# Patient Record
Sex: MALE | Race: WHITE
[De-identification: names, ages, dates, MRNs, and addresses within clinical notes are randomized per-mention and may not be internally consistent; named-entity substitution may affect disease eponyms.]

---

## 2017-08-01 ENCOUNTER — HOSPITAL ENCOUNTER (OUTPATIENT)
Dept: HOSPITAL 63 - DXRADRC | Age: 67
Discharge: HOME | End: 2017-08-01
Attending: PHYSICIAN ASSISTANT
Payer: MEDICARE

## 2017-08-01 DIAGNOSIS — M25.521: Primary | ICD-10-CM

## 2017-08-01 PROCEDURE — 73080 X-RAY EXAM OF ELBOW: CPT

## 2017-08-01 NOTE — RAD
Right elbow, 3 views, 8/1/2017:



History: Elbow pain



No fracture or dislocation is identified. There is mild spurring arising from

the coronoid process of the proximal ulna. No joint effusion is evident.



IMPRESSION: No acute bony abnormality is detected.

## 2017-10-24 ENCOUNTER — HOSPITAL ENCOUNTER (OUTPATIENT)
Dept: HOSPITAL 63 - DXRADRC | Age: 67
Discharge: HOME | End: 2017-10-24
Attending: NURSE PRACTITIONER
Payer: MEDICARE

## 2017-10-24 DIAGNOSIS — R05: ICD-10-CM

## 2017-10-24 DIAGNOSIS — R09.89: Primary | ICD-10-CM

## 2017-10-24 DIAGNOSIS — M53.80: ICD-10-CM

## 2017-10-24 PROCEDURE — 71020: CPT

## 2017-10-24 NOTE — RAD
Chest, 2 views, 10/24/2017:



History: Nonproductive cough and sinus congestion



Comparison is made to a study from 3/1/2017. The heart size and pulmonary

vascularity are normal. There are calcified nodes at the right hilum. No acute

infiltrate is seen. There is no evidence of pleural fluid. Moderate scattered

spurs are present in the spine.



IMPRESSION: No acute cardiopulmonary abnormality is detected.

## 2018-01-19 ENCOUNTER — HOSPITAL ENCOUNTER (EMERGENCY)
Dept: HOSPITAL 61 - ER | Age: 68
Discharge: HOME | End: 2018-01-19
Payer: MEDICARE

## 2018-01-19 VITALS — DIASTOLIC BLOOD PRESSURE: 66 MMHG | SYSTOLIC BLOOD PRESSURE: 138 MMHG

## 2018-01-19 DIAGNOSIS — R09.81: ICD-10-CM

## 2018-01-19 DIAGNOSIS — Z88.0: ICD-10-CM

## 2018-01-19 DIAGNOSIS — I10: ICD-10-CM

## 2018-01-19 DIAGNOSIS — R42: Primary | ICD-10-CM

## 2018-01-19 DIAGNOSIS — I48.91: ICD-10-CM

## 2018-01-19 LAB
ADD MAN DIFF?: NO
ALBUMIN SERPL-MCNC: 3.5 G/DL (ref 3.4–5)
ALBUMIN/GLOB SERPL: 0.9 {RATIO} (ref 1–1.7)
ALP SERPL-CCNC: 77 U/L (ref 46–116)
ALT (SGPT): 25 U/L (ref 16–63)
ANION GAP SERPL CALC-SCNC: 11 MMOL/L (ref 6–14)
AST SERPL-CCNC: 20 U/L (ref 15–37)
BASO #: 0.1 X10^3/UL (ref 0–0.2)
BASO %: 1 % (ref 0–3)
BLOOD UREA NITROGEN: 19 MG/DL (ref 8–26)
BUN/CREAT SERPL: 21 (ref 6–20)
CALCIUM: 8.3 MG/DL (ref 8.5–10.1)
CHLORIDE: 104 MMOL/L (ref 98–107)
CO2 SERPL-SCNC: 27 MMOL/L (ref 21–32)
CREAT SERPL-MCNC: 0.9 MG/DL (ref 0.7–1.3)
EOS #: 0.3 X10^3/UL (ref 0–0.7)
EOS %: 5 % (ref 0–3)
GFR SERPLBLD BASED ON 1.73 SQ M-ARVRAT: 84.4 ML/MIN
GLOBULIN SER-MCNC: 3.9 G/DL (ref 2.2–3.8)
GLUCOSE SERPL-MCNC: 103 MG/DL (ref 70–99)
HCG SERPL-ACNC: 6.2 X10^3/UL (ref 4–11)
HEMATOCRIT: 45.8 % (ref 39–53)
HEMOGLOBIN: 15.3 G/DL (ref 13–17.5)
LYMPH #: 2 X10^3/UL (ref 1–4.8)
LYMPH %: 33 % (ref 24–48)
MAGNESIUM: 1.9 MG/DL (ref 1.8–2.4)
MEAN CORPUSCULAR HEMOGLOBIN: 30 PG (ref 25–35)
MEAN CORPUSCULAR HGB CONC: 33 G/DL (ref 31–37)
MEAN CORPUSCULAR VOLUME: 90 FL (ref 79–100)
MONO #: 0.9 X10^3/UL (ref 0–1.1)
MONO %: 14 % (ref 0–9)
NEUT #: 2.9 X10^3UL (ref 1.8–7.7)
NEUT %: 47 % (ref 31–73)
PLATELET COUNT: 178 X10^3/UL (ref 140–400)
POTASSIUM SERPL-SCNC: 3.8 MMOL/L (ref 3.5–5.1)
RED BLOOD COUNT: 5.11 X10^6/UL (ref 4.3–5.7)
RED CELL DISTRIBUTION WIDTH: 13.7 % (ref 11.5–14.5)
SODIUM: 142 MMOL/L (ref 136–145)
TOTAL BILIRUBIN: 0.2 MG/DL (ref 0.2–1)
TOTAL PROTEIN: 7.4 G/DL (ref 6.4–8.2)

## 2018-01-19 PROCEDURE — 36415 COLL VENOUS BLD VENIPUNCTURE: CPT

## 2018-01-19 PROCEDURE — 85025 COMPLETE CBC W/AUTO DIFF WBC: CPT

## 2018-01-19 PROCEDURE — 93005 ELECTROCARDIOGRAM TRACING: CPT

## 2018-01-19 PROCEDURE — 99285 EMERGENCY DEPT VISIT HI MDM: CPT

## 2018-01-19 PROCEDURE — 80053 COMPREHEN METABOLIC PANEL: CPT

## 2018-01-19 PROCEDURE — 83735 ASSAY OF MAGNESIUM: CPT

## 2018-01-19 RX ADMIN — MECLIZINE 1 MG: 12.5 TABLET ORAL at 22:57

## 2019-01-07 ENCOUNTER — HOSPITAL ENCOUNTER (OUTPATIENT)
Dept: HOSPITAL 63 - CT | Age: 69
Discharge: HOME | End: 2019-01-07
Attending: PHYSICIAN ASSISTANT
Payer: MEDICARE

## 2019-01-07 DIAGNOSIS — R09.81: Primary | ICD-10-CM

## 2019-01-07 PROCEDURE — 70486 CT MAXILLOFACIAL W/O DYE: CPT

## 2019-01-07 NOTE — RAD
EXAM: Paranasal sinus CT without contrast.

 

HISTORY: Sinusitis.

 

TECHNIQUE: Computed tomographic images the paranasal sinuses were obtained

without contrast. 

*One or more of the following individualized dose reduction techniques 

were utilized for this examination:  

1. Automated exposure control.  

2. Adjustment of the mA and/or kV according to patient size.  

3. Use of iterative reconstruction technique.

 

COMPARISON: None.

 

FINDINGS: There is minimal superior right greater than left maxillary 

sinus mucosal thickening and there is a tiny inferior right maxillary 

sinus mucous retention cyst. The ostiomeatal units are patent. There is no

significant nasal septal deviation. There is no sinus air-fluid level or 

opacification. There is no sinus wall erosion or thickening.

 

There is evidence of lens surgery. The mastoid air cells are clear. The 

visualized portions of the brain and calvarium are unremarkable.

 

IMPRESSION: Minimal maxillary sinus mucosal thickening and tiny right 

maxillary sinus mucous retention cyst.

 

Electronically signed by: Kylah Worrell MD (1/7/2019 9:53 AM) [id number]

## 2020-06-09 ENCOUNTER — HOSPITAL ENCOUNTER (OUTPATIENT)
Dept: HOSPITAL 63 - PMG | Age: 70
Discharge: HOME | End: 2020-06-09
Attending: PHYSICIAN ASSISTANT
Payer: MEDICARE
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
## 2020-06-09 DIAGNOSIS — M20.001: ICD-10-CM

## 2020-06-09 DIAGNOSIS — S62.632A: Primary | ICD-10-CM
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
## 2020-06-09 DIAGNOSIS — M79.89: ICD-10-CM
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
## 2020-06-09 PROCEDURE — 73140 X-RAY EXAM OF FINGER(S): CPT

## 2020-06-09 NOTE — RAD
EXAM: Right long finger, 3 views.

 

HISTORY: Blunt trauma.

 

COMPARISON: None.

 

FINDINGS: 3 views of the right long finger are obtained. There is a 

displaced fracture along the dorsal base of the third distal phalanx. 

There is surrounding soft tissue swelling. No radiodense foreign body is 

seen. There is slight deformity of the tuft of the second distal phalanx 

which may be developmental or the sequela of remote injury. There is a 

healed fifth metacarpal fracture.

 

IMPRESSION: Displaced fracture along the dorsal base of the third distal 

phalanx.

 

Electronically signed by: Kylah Worrell MD (6/9/2020 7:57 AM) [id number]

## 2020-12-16 ENCOUNTER — HOSPITAL ENCOUNTER (OUTPATIENT)
Dept: HOSPITAL 61 - KCIC | Age: 70
End: 2020-12-16
Attending: NURSE PRACTITIONER
Payer: MEDICARE
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
## 2020-12-16 DIAGNOSIS — S69.91XA: Primary | ICD-10-CM
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
## 2020-12-16 PROCEDURE — 73140 X-RAY EXAM OF FINGER(S): CPT
